# Patient Record
(demographics unavailable — no encounter records)

---

## 2025-03-18 NOTE — HISTORY OF PRESENT ILLNESS
[FreeTextEntry1] : Toshia Yap is a very pleasant 62-year-old woman with a past medical history of prediabetes, hyperlipidemia, and hypertension in the past surgical history of an appendectomy, C-sections, total abdominal hysterectomy with unilateral salpingo-oophorectomy as well as an abdominoplasty who was referred to our office with a known hiatal hernia, H. pylori, and a chief complaint of symptomatic reflux.  The patient states that she was found to have a hiatal hernia 2 years ago but is unsure of how was diagnosed.  She is seen by Dr. Blaine Reinoso from gastroenterology.  She states that her symptoms have been worse over the last 8 to 10 months.  She had a barium esophagram last week that showed a diaphragmatic hernia containing a significant portion of fundus as well as mild dysmotility of the distal esophagus.  She also reports some odynophagia and occasional dysphagia.  She says that her reflux is worse at night and in the morning.  I met with her to discuss possible surgical intervention.

## 2025-03-18 NOTE — PHYSICAL EXAM
[General Appearance - Alert] : alert [General Appearance - In No Acute Distress] : in no acute distress [Sclera] : the sclera and conjunctiva were normal [PERRL With Normal Accommodation] : pupils were equal in size, round, and reactive to light [Extraocular Movements] : extraocular movements were intact [Neck Appearance] : the appearance of the neck was normal [Neck Cervical Mass (___cm)] : no neck mass was observed [Jugular Venous Distention Increased] : there was no jugular-venous distention [Thyroid Diffuse Enlargement] : the thyroid was not enlarged [Thyroid Nodule] : there were no palpable thyroid nodules [Auscultation Breath Sounds / Voice Sounds] : lungs were clear to auscultation bilaterally [Heart Rate And Rhythm] : heart rate was normal and rhythm regular [Heart Sounds] : normal S1 and S2 [Heart Sounds Gallop] : no gallops [Murmurs] : no murmurs [Heart Sounds Pericardial Friction Rub] : no pericardial rub [Examination Of The Chest] : the chest was normal in appearance [Chest Visual Inspection Thoracic Asymmetry] : no chest asymmetry [Diminished Respiratory Excursion] : normal chest expansion [Bowel Sounds] : normal bowel sounds [Abdomen Soft] : soft [Abnormal Walk] : normal gait [Nail Clubbing] : no clubbing  or cyanosis of the fingernails [Musculoskeletal - Swelling] : no joint swelling seen [Motor Tone] : muscle strength and tone were normal [Skin Color & Pigmentation] : normal skin color and pigmentation [Skin Turgor] : normal skin turgor [] : no rash [Deep Tendon Reflexes (DTR)] : deep tendon reflexes were 2+ and symmetric [Sensation] : the sensory exam was normal to light touch and pinprick [No Focal Deficits] : no focal deficits [Oriented To Time, Place, And Person] : oriented to person, place, and time [Impaired Insight] : insight and judgment were intact [Affect] : the affect was normal [FreeTextEntry1] : +well healed scars in lower abdomen

## 2025-03-18 NOTE — REVIEW OF SYSTEMS
[Constipation] : constipation [Heartburn] : heartburn [Negative] : Heme/Lymph [Abdominal Pain] : no abdominal pain [Vomiting] : no vomiting [Diarrhea] : no diarrhea [Melena] : no melena

## 2025-03-18 NOTE — ASSESSMENT
[FreeTextEntry1] : Briefly, this is a very pleasant 62-year-old woman with a past medical history of hypertension, hyperlipidemia, reflux, known hiatal hernia, and H pylori infection, as well as a past surgical history of appendectomy, , hysterectomy with salpingo-oophorectomy, and abdominoplasty who was referred by Dr. Blaine Reinoso for a symptomatic hiatal hernia.  I personally reviewed her recent esophagram and agree with the radiologist that there is a clear hiatal hernia, and there does appear to be some mild dysmotility of the esophagus.  I also reviewed the imaging with the patient.   Given her symptoms and the presence of a hiatal hernia, I recommended operative intervention as she seems to be a good surgical candidate and I think the operation will be beneficial to her.  We discussed doing a EGD, robotic hiatal hernia repair and fundoplication and reviewed the risks, benefits and alternatives to this.  We also reviewed the conduct of the operation as well as the expected postoperative course.  At this time, she wishes to discuss the surgery with her family members.  We also discussed strategies to mitigate worsening of her hiatal hernia including avoiding lifting heavy objects and straining.  She asked appropriate and intelligent questions.  We will order her a CT of the abdomen for anticipated preoperative planning.  We will check back in with her in a few weeks to see if she wishes to proceed with surgery.  Thank you for involving me in the care of this patient.  Please feel free to reach out with any questions or concerns.  Christoph Ritchie MD Thoracic Surgery  A total of 55 minutes was spent reviewing the patient's chart and imaging, meeting with the patient, discussing management, care coordination and documentation.

## 2025-03-18 NOTE — DATA REVIEWED
[FreeTextEntry1] : Barium Esophagram: 3/10/25: IMPRESSION: Diaphragmatic hernia containing portion of fundus of stomach.  Mild dysmotility of distal esophagus.

## 2025-07-28 NOTE — HISTORY OF PRESENT ILLNESS
[FreeTextEntry1] : Toshia Yap is a 62-year-old woman with a past medical history of prediabetes, hyperlipidemia, and hypertension in the past surgical history of an appendectomy, C-sections, total abdominal hysterectomy with unilateral salpingo-oophorectomy as well as an abdominoplasty who I am following up with for a known hiatal hernia, H pylori, and a chief complaint of symptomatic reflux.  By way of review, she was initially referred to our office by Dr. Blaine Reinoso with a known hiatal hernia, H. pylori, and a chief complaint of symptomatic reflux back in March of 2025. At that time, she stated that she was found to have a hiatal hernia 2 years ago but was unsure of how was diagnosed. Her symptoms had been worse over the last 8 to 10 months. She had a barium esophagram that showed a diaphragmatic hernia containing a significant portion of fundus as well as mild dysmotility of the distal esophagus. She reported some odynophagia and occasional dysphagia and that her reflux was worse at night and in the morning.  When I met her, we discussed possible surgical intervention and a CT abdomen was ordered for pre-operative planning. She postponed the CT scan but ultimately had it done on 7/22/25 and I spoke with her to follow up.  Today, she reports feeling ok. She is still having significant reflux. She says she has some occasional odynophagia. She denied any dysphagia.  Denied any weight loss or other complaints. She says she can go up two flights of stairs without difficulty.

## 2025-07-28 NOTE — REASON FOR VISIT
[Home] : at home, [unfilled] , at the time of the visit. [Medical Office: ([facility])___] : at the medical office located in  [Telehealth (audio & video)] : This visit was provided via telehealth using real-time 2-way audio visual technology. [Verbal consent obtained from patient] : the patient, [unfilled] [Follow-Up: _____] : a [unfilled] follow-up visit

## 2025-07-28 NOTE — DATA REVIEWED
[FreeTextEntry1] : Exam Date:      07/22/25 Exam:         CT ABDOMEN OC Order#:       [id number]  CLINICAL INFORMATION: Paraesophageal hiatal hernia.   COMPARISON: None.   CONTRAST/COMPLICATIONS:  IV Contrast: NONE  Oral Contrast: Omnipaque 300.   PROCEDURE:  CT of the Abdomen was performed.  Sagittal and coronal reformats were performed.   FINDINGS:  LOWER CHEST: Coronary atherosclerosis..   LIVER: Indeterminate 1.6 cm hypodensity in the dome of the liver (3/22)  BILE DUCTS: Normal caliber.  GALLBLADDER: Partially distended..  SPLEEN: Within normal limits.  PANCREAS: Within normal limits.  ADRENALS: Within normal limits.  KIDNEYS/URETERS: Within normal limits.   IMAGED PORTIONS:   BOWEL: No bowel obstruction. Large paraesophageal hernia. Moderate amount of stool in the colon  PERITONEUM/RETROPERITONEUM: Within normal limits.  VESSELS: Atherosclerotic changes.  LYMPH NODES: No lymphadenopathy.  ABDOMINAL WALL: Within normal limits.  BONES: Degenerative changes.  IMPRESSION:   Large paraesophageal hernia.   Indeterminate 1.6 cm hypodensity in the dome of the liver. Further evaluation with MRI with and without contrast may be of benefit.   --- End of Report ---  ***Electronically Signed ***

## 2025-07-28 NOTE — ASSESSMENT
[FreeTextEntry1] : Briefly, this is a very pleasant 62-year-old woman with a past medical history of hypertension, hyperlipidemia, reflux, known hiatal hernia, and H pylori infection, as well as a past surgical history of appendectomy, , hysterectomy with salpingo-oophorectomy, and abdominoplasty who was referred by Dr. Blaine Reinoso for a symptomatic hiatal hernia.  I personally reviewed her abdominal CT which also shows a large hiatal hernia.  I think with her symptoms she will have significant relief with a hiatal hernia repair and fundoplication. Prior to this though, I think we should further evaluate the liver lesion. I have reached out to Dr. Reinoso to see if he has any prior imaging on this to compare to.  We will work to see if we can get her MRI ordered. I will plan to see her back in person once that is done and hopefully we can get her set up for surgery at that point.  She and her daughter who was on the call were in agreement with that plan.  Thank you for involving me in the care of this patient. Please feel free to reach out with any questions or concerns.  Christoph Ritchie MD Thoracic Surgery

## 2025-07-28 NOTE — PHYSICAL EXAM
[Oriented To Time, Place, And Person] : oriented to person, place, and time [Impaired Insight] : insight and judgment were intact [Affect] : the affect was normal [Mood] : the mood was normal